# Patient Record
Sex: FEMALE | Race: WHITE | NOT HISPANIC OR LATINO
[De-identification: names, ages, dates, MRNs, and addresses within clinical notes are randomized per-mention and may not be internally consistent; named-entity substitution may affect disease eponyms.]

---

## 2018-04-02 ENCOUNTER — RX ONLY (OUTPATIENT)
Age: 58
Setting detail: RX ONLY
End: 2018-04-02

## 2019-10-15 ENCOUNTER — THERAPY VISIT (OUTPATIENT)
Dept: OCCUPATIONAL THERAPY | Facility: CLINIC | Age: 59
End: 2019-10-15
Payer: COMMERCIAL

## 2019-10-15 DIAGNOSIS — M79.632 PAIN OF LEFT FOREARM: ICD-10-CM

## 2019-10-15 PROCEDURE — 97110 THERAPEUTIC EXERCISES: CPT | Mod: GO | Performed by: OCCUPATIONAL THERAPIST

## 2019-10-15 PROCEDURE — 97165 OT EVAL LOW COMPLEX 30 MIN: CPT | Mod: GO | Performed by: OCCUPATIONAL THERAPIST

## 2019-10-15 PROCEDURE — 97140 MANUAL THERAPY 1/> REGIONS: CPT | Mod: GO | Performed by: OCCUPATIONAL THERAPIST

## 2019-10-15 PROCEDURE — 97035 APP MDLTY 1+ULTRASOUND EA 15: CPT | Mod: GO | Performed by: OCCUPATIONAL THERAPIST

## 2019-10-15 NOTE — LETTER
[facility]  [address]  [place] [address]  [facility] [address]  [phone]    2019    Re: Theresa Palmer   :   1960  MRN:  [id number]   REFERRING PHYSICIAN:   Juanjose Chavez    [facility]  Date of Initial Evaluation:  10/15/19  Visits:  Rxs Used: 1  Reason for Referral:  Pain of left forearm    EVALUATION SUMMARY    Hand Therapy Initial Evaluation    Current Date:  10/15/2019  Referring Physician: Self Referred    Diagnosis: Left forearm pain  DOI: 2019    Subjective:  Theresa Palmer is a 59 year old left hand dominant female.    Patient reports symptoms of pain, stiffness/loss of motion, weakness/loss of strength, numbness and tingling  of the left forearm, wrist and fingers which occurred due to an unknown etiology. Since onset symptoms are Gradually getting better.  Special tests:  none.  Previous treatment: brace.    General health as reported by patient is good.  Pertinent medical history includes:Cancer, Depression, Osteoarthritis, Rheumatoid Arthritis  Medical allergies:sulfa, penicillin.  Surgical history: orthopedic: 8 foots, 1 left shoulder.  Medication history: Anti-depressants, Sleep.    Occupational Profile Information:  Current occupation is [employer]  Currently working in normal job without restrictions  Job Tasks: Computer Work, Prolonged Sitting  Prior functional level:  no limitations  Barriers include:none  Mobility: No difficulty  Transportation: drives  Leisure activities/hobbies: garden, traveling, reading    Upper Extremity Functional Index Score:  SCORE:   Column Totals: /80: 66   (A lower score indicates greater disability.)    Objective:  Pain Level (Scale 0-10):   10/15/19   At Rest 0/10   With Use 3/10     Pain Description:  Date 10/15/2019   Location (L) volar forearm   Pain Quality Aching and Sharp   Frequency intermittent     Pain is worst  daytime   Exacerbated by  use, gripping, lifting into flexion   Relieved  by cold and rest   Progression gradually improving     ROM  Pain Report: - none  + mild    ++ moderate    +++ severe   Wrist 10/15/2019 10/15/2019   AROM (PROM) Right Left   Extension 65 75   Flexion 75 75   RD 22 22   UD 50 45   Supination 80 75   Pronation 80 85      Strength   (Measured in pounds)  Pain Report: - none  + mild    ++ moderate    +++ severe    10/15/19 10/15/19   Trials right Left   1  2  3 39  31  38 41  42  36   Average 36 40     Lat Pinch 10/15/2019 10/15/2019   Trials Right Left   1  2  3 14 14   Average       3 Pt Pinch 10/15/2019 10/15/2019   Trials Right Left   1  2  3 14 13   Average        Edema:  []         None   [x]         Mild    []         Moderate      []         Severe                  Location: (L) proximal volar forearm    Color/Temperature:     [x]        Normal  []        Abnormal    Palpation:  []         Normal        [x]       Tender       []      Mild         [x]       Moderate []       Severe     Location: (L) proximal volar central forearm    Sensation: []                   WNL throughout all nerve distributions; per patient report    [x]                   Decreased  []        Median    []        Ulnar    []        Radial nerve distribution    Sensation: Decreased Median Nerve distribution  Numbness/Tingling Level Report  VAS(0-10) 10/15/19   At Rest: 1/10   With Use: 2-3/10     Special Tests:  Date 10/15/2019    Side Left    Phalens neg    Tinels at CT neg    Tinels at Pronator neg    Tinels at 8 in DRC positive    Median Nerve ULTT NT    Paresthesias IF, MF & RF    Compression test at prox. Volar forearm positive    Compression Test at CT neg      Assessment:  Patient presents with symptoms consistent with diagnosis of arm pain,  with conservative intervention.     Patient's limitations or Problem List includes:  Pain, Increased edema, Weakness, Sensory disturbance, Decreased  and Tightness in musculature of the left wrist, hand and forearm which interferes with  the patient's ability to perform Work Tasks, Recreational Activities and Household Chores as compared to previous level of function.    Rehab Potential:  Excellent - Return to full activity, no limitations    Patient will benefit from skilled Occupational Therapy to increase flexibility, overall strength,  strength, forearm strength and sensation and decrease pain and edema to return to previous activity level and resume normal daily tasks and to reach their rehab potential.    Barriers to Learning:  No barrier    Communication Issues:  Patient appears to be able to clearly communicate and understand verbal and written communication and follow directions correctly.    Chart Review: Brief history including review of medical and/or therapy records relating to the presenting problem and Simple history review with patient    Identified Performance Deficits: driving and community mobility, home establishment and management and work    Assessment of Occupational Performance:  3-5 Performance Deficits    Clinical Decision Making (Complexity): Low complexity    Treatment Explanation:  The following has been discussed with the patient:  RX ordered/plan of care  Anticipated outcomes  Possible risks and side effects    Frequency:  1 X week, once daily  Duration:  for 8 weeks  Treatment Plan:   Modalities:  US  Therapeutic Exercise:  AROM, AAROM, PROM, Tendon Gliding, Blocking, Isotonics and Isometrics  Neuromuscular re-education:  Nerve Gliding and Kinesiotaping  Manual Techniques:  Myofascial release  Orthotic Fabrication:  Static orthosis and Forearm based orthosis (patient has an OTC brace)  Self Care: Diagnostic education    Avoid activities that exacerbate median nerve symptoms    Avoid prolonged flexion or extension of the wrist    Discharge Plan:  Achieve all LTG.  Independent in home treatment program.  Reach maximal therapeutic benefit.    Home Exercise Program:  Warmth and Ice as indicated  Tendon  Glides  Proximal Median Nerve Glides  Massage to flexors  Stretch to flexors    Next Visit:  US  MFR  Stretch to flexors  Passive proximal median nerve glides  Trial of K-tape    Thank you for your referral.    INQUIRIES  Therapist: OC Darby/L, CHT  [facility]  [address]  [place] [address]  [facility] [address]  Phone: [phone]  Fax: [phone]

## 2019-10-15 NOTE — PROGRESS NOTES
Hand Therapy Initial Evaluation    Current Date:  10/15/2019  Referring Physician: Self Referred    Diagnosis: Left forearm pain  DOI: 5/2019    Subjective:  Theresa Palmer is a 59 year old left hand dominant female.    Patient reports symptoms of pain, stiffness/loss of motion, weakness/loss of strength, numbness and tingling  of the left forearm, wrist and fingers which occurred due to an unknown etiology. Since onset symptoms are Gradually getting better.  Special tests:  none.  Previous treatment: brace.    General health as reported by patient is good.  Pertinent medical history includes:Cancer, Depression, Osteoarthritis, Rheumatoid Arthritis  Medical allergies:sulfa, penicillin.  Surgical history: orthopedic: 8 foots, 1 left shoulder.  Medication history: Anti-depressants, Sleep.    Occupational Profile Information:  Current occupation is [employer]  Currently working in normal job without restrictions  Job Tasks: Computer Work, Prolonged Sitting  Prior functional level:  no limitations  Barriers include:none  Mobility: No difficulty  Transportation: drives  Leisure activities/hobbies: garden, traveling, reading    Upper Extremity Functional Index Score:  SCORE:   Column Totals: /80: 66   (A lower score indicates greater disability.)    Objective:  Pain Level (Scale 0-10):   10/15/19   At Rest 0/10   With Use 3/10     Pain Description:  Date 10/15/2019   Location (L) volar forearm   Pain Quality Aching and Sharp   Frequency intermittent     Pain is worst  daytime   Exacerbated by  use, gripping, lifting into flexion   Relieved by cold and rest   Progression gradually improving     ROM  Pain Report: - none  + mild    ++ moderate    +++ severe   Wrist 10/15/2019 10/15/2019   AROM (PROM) Right Left   Extension 65 75   Flexion 75 75   RD 22 22   UD 50 45   Supination 80 75   Pronation 80 85      Strength   (Measured in pounds)  Pain Report: - none  + mild    ++ moderate    +++ severe     10/15/19 10/15/19   Trials right Left   1  2  3 39  31  38 41  42  36   Average 36 40     Lat Pinch 10/15/2019 10/15/2019   Trials Right Left   1  2  3 14 14   Average       3 Pt Pinch 10/15/2019 10/15/2019   Trials Right Left   1  2  3 14 13   Average        Edema:  []         None   [x]         Mild    []         Moderate      []         Severe                  Location: (L) proximal volar forearm    Color/Temperature:     [x]        Normal  []        Abnormal    Palpation:  []         Normal        [x]       Tender       []      Mild         [x]       Moderate []       Severe     Location: (L) proximal volar central forearm    Sensation: []                   WNL throughout all nerve distributions; per patient report    [x]                   Decreased  []        Median    []        Ulnar    []        Radial nerve distribution    Sensation: Decreased Median Nerve distribution  Numbness/Tingling Level Report  VAS(0-10) 10/15/19   At Rest: 1/10   With Use: 2-3/10     Special Tests:  Date 10/15/2019    Side Left    Phalens neg    Tinels at CT neg    Tinels at Pronator neg    Tinels at 8 in DRC positive    Median Nerve ULTT NT    Paresthesias IF, MF & RF    Compression test at prox. Volar forearm positive    Compression Test at CT neg      Assessment:  Patient presents with symptoms consistent with diagnosis of arm pain,  with conservative intervention.     Patient's limitations or Problem List includes:  Pain, Increased edema, Weakness, Sensory disturbance, Decreased  and Tightness in musculature of the left wrist, hand and forearm which interferes with the patient's ability to perform Work Tasks, Recreational Activities and Household Chores as compared to previous level of function.    Rehab Potential:  Excellent - Return to full activity, no limitations    Patient will benefit from skilled Occupational Therapy to increase flexibility, overall strength,  strength, forearm strength and sensation and decrease  pain and edema to return to previous activity level and resume normal daily tasks and to reach their rehab potential.    Barriers to Learning:  No barrier    Communication Issues:  Patient appears to be able to clearly communicate and understand verbal and written communication and follow directions correctly.    Chart Review: Brief history including review of medical and/or therapy records relating to the presenting problem and Simple history review with patient    Identified Performance Deficits: driving and community mobility, home establishment and management and work    Assessment of Occupational Performance:  3-5 Performance Deficits    Clinical Decision Making (Complexity): Low complexity    Treatment Explanation:  The following has been discussed with the patient:  RX ordered/plan of care  Anticipated outcomes  Possible risks and side effects    Frequency:  1 X week, once daily  Duration:  for 8 weeks  Treatment Plan:   Modalities:  US  Therapeutic Exercise:  AROM, AAROM, PROM, Tendon Gliding, Blocking, Isotonics and Isometrics  Neuromuscular re-education:  Nerve Gliding and Kinesiotaping  Manual Techniques:  Myofascial release  Orthotic Fabrication:  Static orthosis and Forearm based orthosis (patient has an OTC brace)  Self Care: Diagnostic education    Avoid activities that exacerbate median nerve symptoms    Avoid prolonged flexion or extension of the wrist    Discharge Plan:  Achieve all LTG.  Independent in home treatment program.  Reach maximal therapeutic benefit.      Home Exercise Program:  Warmth and Ice as indicated  Tendon Glides  Proximal Median Nerve Glides  Massage to flexors  Stretch to flexors    Next Visit:  US  MFR  Stretch to flexors  Passive proximal median nerve glides  Trial of K-tape

## 2019-12-11 PROBLEM — M79.632 PAIN OF LEFT FOREARM: Status: RESOLVED | Noted: 2019-10-15 | Resolved: 2019-12-11

## 2020-12-30 ENCOUNTER — THERAPY VISIT (OUTPATIENT)
Dept: OCCUPATIONAL THERAPY | Facility: CLINIC | Age: 60
End: 2020-12-30
Payer: COMMERCIAL

## 2020-12-30 DIAGNOSIS — M05.741 RHEUMATOID ARTHRITIS INVOLVING BOTH HANDS WITH POSITIVE RHEUMATOID FACTOR (H): ICD-10-CM

## 2020-12-30 DIAGNOSIS — M79.642 PAIN IN BOTH HANDS: ICD-10-CM

## 2020-12-30 DIAGNOSIS — M79.641 PAIN IN BOTH HANDS: ICD-10-CM

## 2020-12-30 DIAGNOSIS — M19.90 ARTHRITIS: ICD-10-CM

## 2020-12-30 DIAGNOSIS — M05.742 RHEUMATOID ARTHRITIS INVOLVING BOTH HANDS WITH POSITIVE RHEUMATOID FACTOR (H): ICD-10-CM

## 2020-12-30 PROCEDURE — 97530 THERAPEUTIC ACTIVITIES: CPT | Mod: GO | Performed by: OCCUPATIONAL THERAPIST

## 2020-12-30 PROCEDURE — 97165 OT EVAL LOW COMPLEX 30 MIN: CPT | Mod: GO | Performed by: OCCUPATIONAL THERAPIST

## 2020-12-30 PROCEDURE — 97018 PARAFFIN BATH THERAPY: CPT | Mod: GO | Performed by: OCCUPATIONAL THERAPIST

## 2020-12-30 PROCEDURE — 97110 THERAPEUTIC EXERCISES: CPT | Mod: GO | Performed by: OCCUPATIONAL THERAPIST

## 2020-12-30 NOTE — PROGRESS NOTES
Hand Therapy Initial Evaluation    Current Date:  12/30/2020  Referring Physician:Zulay Laguna DO      Diagnosis: Bilateral hand pain, RA (left greater than right)  DOI: 12/14/20 (Date of order)    Subjective:  Theresa Palmer is a 60 year old left hand dominant female.    Patient reports symptoms of pain, stiffness/loss of motion, weakness/loss of strength, edema, numbness and tingling  of the bilateral hands and wrists which occurred due to overuse and RA. Since onset symptoms are Gradually getting worse.  Special tests:  x-ray.  Previous treatment: hand therapy.    Pertinent medical history includes:Cancer, Depression, Osteoarthritis, Rheumatoid Arthritis, Mental Illness, Menopausal, calf pain, swelling, warmth  Medical allergies:sulfa, penicillin.  Surgical history: orthopedic: 8 foots, 1 left shoulder.  Medication history: Anti-depressants, Sleep, Enbrel, Methotrexate, Cymbalta, Wellbutrin, Klonapin,      Occupational Profile Information:  Current occupation is None, Currently on short term disability, applying for long term  Currently not working due to present treatment problem  Job Tasks: Computer Work, Driving, Lifting, Carrying, Prolonged Sitting, Repetitive Tasks  Prior functional level:  no limitations  Barriers include:none  Mobility: No difficulty  Transportation: drives  Leisure activities/hobbies: reading, walk, needlework, travel, biking, gardening    Upper Extremity Functional Index Score:  SCORE:   Column Totals: /80: 47   (A lower score indicates greater disability.)      Objective:  Pain Level (Scale 0-10):   12/30/20   At Rest 1-2/10   With Use 8-10/10     Pain Description:  Date 12/30/2020   Location wrist, hand and tips of fingers   Pain Quality Aching, Numb, Sharp, Tingling, tender and intense   Frequency intermittent     Pain is worst  daytime   Exacerbated by  typing, writing, gripping, pinching, lifting,    Relieved by rest and Tylenol, ice on wrists   Progression Gradually worsening      ROM  Pain Report:  + mild    ++ moderate    +++ severe   Wrist 12/30/2020 12/30/2020   AROM (PROM) Right Left   Extension 55 62   Flexion 75 65   RD 20 25   UD 50 45   Supination 75 75   Pronation 85 80       ROM  Thumb 12/30/2020 12/30/2020   AROM  (PROM) Right Left   MP / /   IP / /   RABD 50 45   PABD 50 50   Kapandji Opposition Scale (0-10/10) 10/10 10/10     Thumb Observation/Appearance  Key: + = present/ - = not observed    12/30/2020   Shoulder deformity present over CMC R:-  L:-   Volar subluxation present R:-  L:-   Edema over the CMC joint R:-  L:-   Noted collapse of MP into hyperextension during pinch R:-  L:-   Tenderness at CMC R:-  L:+      Provocative Tests  Pain Report:  - none    + mild    ++ moderate    +++ severe     12/30/2020   CMC Grind test R: -  L: -   Crepitus present R: -  L: -   CMC Adduction Stress Test R: +  L: +   CMC Extension Stress Test R: -  L: -   Finkelstein's R: -  L: -     Strength   (Measured in pounds)  Pain Report: - none  + mild    ++ moderate    +++ severe    12/30/2020 12/30/2020   Trials Right Left   1  2  3 34  36  37 31  26  32   Average 36 30     Lat Pinch 12/30/2020 12/30/2020   Trials Right Left   1  2  3 10 13   Average       3 Pt Pinch 12/30/2020 12/30/2020   Trials Right Left   1  2  3 8 8   Average       Assessment:  Patient presents with symptoms consistent with diagnosis of hand arthritis, with conservative intervention.    Patient s limitations or Problem List includes:  Pain, Decreased ROM/motion, weakness,  decreased  and pinch strength which interferes with patients ability to perform  self care, home maintenance, work and sports/recreational as compared to previous level of function.    Rehab Potential:  Good - Return to full activity, some limitations    Patient will benefit from skilled Occupational Therapy to increase ROM, flexibility and overall strength and decrease pain to return to previous activity level and resume normal daily tasks  and to reach their rehab potential.    Barriers to Learning:  No barrier    Communication Issues:  Patient appears to be able to clearly communicate and understand verbal and written communication and follow directions correctly.    Chart Review: Brief history including review of medical and/or therapy records relating to the presenting problem and Simple history review with patient    Identified Performance Deficits: bathing/showering, home establishment and management, meal preparation and cleanup, sleep, work and leisure activities    Assessment of Occupational Performance:  5 or more Performance Deficits    Clinical Decision Making (Complexity): Low complexity    Treatment Explanation:  The following has been discussed with the patient:  RX ordered/plan of care  Anticipated outcomes  Possible risks and side effects    Plan:  Frequency:  1 X a month, once daily  Duration:  for 4 months    Treatment Plan:  Modalities:  Paraffin  Therapeutic Exercise: AROM, Isometrics, Tendon glides  Manual Techniques: Joint Mobilization or reseating of the trapezium, self MFR to thumb adductor with clip  Education: Anatomy of CMC, joint protection principles, adaptive equipment as needed    Discharge Plan:  Achieve all LTG  [place] in home treatment program.  Reach maximal therapeutic benefit.    Home Program:  Warmth  Incorporate joint protection into daily functional activities  Adaptive equipment as needed.  Exercise Name: Wrist Active Range of Motion Extension and Flexion Combined - Reps: 10-30 reps  Exercise Name: Active Range of Motion Combined Radial and Ulnar Deviation - Reps: 10-30 reps  Exercise Name: Forearm Active Range of Motion Combined Pronation and Supination - Reps: 10-30 reps  Exercise Name: Wrist Active Range of Motion Circumduction - Reps: 10-30 reps  Exercise Name: Finger Active Range of Motion Tendon Glides Fist Series, Sets: 4 times/day - Reps: 10  Exercise Name: Finger Active Range of Motion Table Top  Fist Series, Sets: 4 times/day - Reps: 10  Exercise Name: Finger Active Range of Motion PIP Joint Blocking, Sets: 2 times/day - Reps: 10  Exercise Name: Finger Active Range of Motion DIP Joint Blocking, Sets: 2 times/day - Reps: 10  Exercise Name:  Intrinsic Finger Active Range of Motion Ab and Adduction, Sets: 3 times/day - Reps: 10  Exercise Name: Thumb Active Range of Motion Opposition, Sets: 3 times/day - Reps: 10    Next Visit:  No further visits scheduled at this time. The chart will be left open for one month to allow patient to schedule further appointments if problems develop. If no additional therapy sessions are scheduled, then the patient will be discharged and this will serve as a discharge note.

## 2020-12-30 NOTE — LETTER
[facility]  [address]  [place] [address]  [facility] [address] [phone]    2020    Re: Theresa Palmer   :   1960  MRN:  [id number]   REFERRING PHYSICIAN:   Zulay MARTINEZ [facility]    Date of Initial Evaluation:  2020  Visits: 1 Rxs Used: 1  Reason for Referral:     Pain in both hands  Rheumatoid arthritis involving both hands with positive rheumatoid factor (H)  Arthritis    Hand Therapy Initial Evaluation    Current Date:  2020  Referring Physician:Zulay Laguna DO      Diagnosis: Bilateral hand pain, RA (left greater than right)  DOI: 20 (Date of order)    Subjective:  Theresa Palmer is a 60 year old left hand dominant female.    Patient reports symptoms of pain, stiffness/loss of motion, weakness/loss of strength, edema, numbness and tingling  of the bilateral hands and wrists which occurred due to overuse and RA. Since onset symptoms are Gradually getting worse.  Special tests:  x-ray.  Previous treatment: hand therapy.    Pertinent medical history includes:Cancer, Depression, Osteoarthritis, Rheumatoid Arthritis, Mental Illness, Menopausal, calf pain, swelling, warmth  Medical allergies:sulfa, penicillin.  Surgical history: orthopedic: 8 foots, 1 left shoulder.  Medication history: Anti-depressants, Sleep, Enbrel, Methotrexate, Cymbalta, Wellbutrin, Klonapin,      Occupational Profile Information:  Current occupation is None, Currently on short term disability, applying for long term  Currently not working due to present treatment problem  Job Tasks: Computer Work, Driving, Lifting, Carrying, Prolonged Sitting, Repetitive Tasks  Prior functional level:  no limitations  Barriers include:none  Mobility: No difficulty  Transportation: drives  Leisure activities/hobbies: reading, walk, needlework, travel, biking, gardening    Upper Extremity Functional Index Score:  Re: Theresa Palmer   :    1960  SCORE:   Column Totals: /80: 47   (A lower score indicates greater disability.)      Objective:  Pain Level (Scale 0-10):   20   At Rest 1-2/10   With Use 8-10/10     Pain Description:  Date 2020   Location wrist, hand and tips of fingers   Pain Quality Aching, Numb, Sharp, Tingling, tender and intense   Frequency intermittent     Pain is worst  daytime   Exacerbated by  typing, writing, gripping, pinching, lifting,    Relieved by rest and Tylenol, ice on wrists   Progression Gradually worsening     ROM  Pain Report:  + mild    ++ moderate    +++ severe   Wrist 2020   AROM (PROM) Right Left   Extension 55 62   Flexion 75 65   RD 20 25   UD 50 45   Supination 75 75   Pronation 85 80       ROM  Thumb 2020   AROM  (PROM) Right Left   MP / /   IP / /   RABD 50 45   PABD 50 50   Kapandji Opposition Scale (0-10/10) 10/10 10/10     Re: Theresa Palmer   :   1960  Thumb Observation/Appearance  Key: + = present/ - = not observed    2020   Shoulder deformity present over CMC R:-  L:-   Volar subluxation present R:-  L:-   Edema over the CMC joint R:-  L:-   Noted collapse of MP into hyperextension during pinch R:-  L:-   Tenderness at CMC R:-  L:+      Provocative Tests  Pain Report:  - none    + mild    ++ moderate    +++ severe     2020   CMC Grind test R: -  L: -   Crepitus present R: -  L: -   CMC Adduction Stress Test R: +  L: +   CMC Extension Stress Test R: -  L: -   Finkelstein's R: -  L: -     Strength   (Measured in pounds)  Pain Report: - none  + mild    ++ moderate    +++ severe    2020   Trials Right Left   1  2  3 34  36  37 31  26  32   Average 36 30     Lat Pinch 2020   Trials Right Left   1  2  3 10 13   Average       3 Pt Pinch 2020   Trials Right Left   1  2  3 8 8   Average     Re: Theresa Palmer   :   1960  Assessment:  Patient presents with symptoms consistent with  diagnosis of hand arthritis, with conservative intervention.    Patient s limitations or Problem List includes:  Pain, Decreased ROM/motion, weakness,  decreased  and pinch strength which interferes with patients ability to perform  self care, home maintenance, work and sports/recreational as compared to previous level of function.    Rehab Potential:  Good - Return to full activity, some limitations    Patient will benefit from skilled Occupational Therapy to increase ROM, flexibility and overall strength and decrease pain to return to previous activity level and resume normal daily tasks and to reach their rehab potential.    Barriers to Learning:  No barrier    Communication Issues:  Patient appears to be able to clearly communicate and understand verbal and written communication and follow directions correctly.    Chart Review: Brief history including review of medical and/or therapy records relating to the presenting problem and Simple history review with patient    Identified Performance Deficits: bathing/showering, home establishment and management, meal preparation and cleanup, sleep, work and leisure activities    Assessment of Occupational Performance:  5 or more Performance Deficits    Clinical Decision Making (Complexity): Low complexity    Treatment Explanation:  The following has been discussed with the patient:  RX ordered/plan of care  Anticipated outcomes  Possible risks and side effects    Plan:  Frequency:  1 X a month, once daily  Duration:  for 4 months    Treatment Plan:  Modalities:  Paraffin  Therapeutic Exercise: AROM, Isometrics, Tendon glides  Manual Techniques: Joint Mobilization or reseating of the trapezium, self MFR to thumb adductor with clip  Education: Anatomy of CMC, joint protection principles, adaptive equipment as needed    Discharge Plan:  Achieve all LTG  [place] in home treatment program.  Reach maximal therapeutic benefit.    Home Program:  [facility]  Re: Theresa Palmer    :   1960  Incorporate joint protection into daily functional activities  Adaptive equipment as needed.  Exercise Name: Wrist Active Range of Motion Extension and Flexion Combined - Reps: 10-30 reps  Exercise Name: Active Range of Motion Combined Radial and Ulnar Deviation - Reps: 10-30 reps  Exercise Name: Forearm Active Range of Motion Combined Pronation and Supination - Reps: 10-30 reps  Exercise Name: Wrist Active Range of Motion Circumduction - Reps: 10-30 reps  Exercise Name: Finger Active Range of Motion Tendon Glides Fist Series, Sets: 4 times/day - Reps: 10  Exercise Name: Finger Active Range of Motion Table Top Fist Series, Sets: 4 times/day - Reps: 10  Exercise Name: Finger Active Range of Motion PIP Joint Blocking, Sets: 2 times/day - Reps: 10  Exercise Name: Finger Active Range of Motion DIP Joint Blocking, Sets: 2 times/day - Reps: 10  Exercise Name:  Intrinsic Finger Active Range of Motion Ab and Adduction, Sets: 3 times/day - Reps: 10  Exercise Name: Thumb Active Range of Motion Opposition, Sets: 3 times/day - Reps: 10    Next Visit:  No further visits scheduled at this time. The chart will be left open for one month to allow patient to schedule further appointments if problems develop. If no additional therapy sessions are scheduled, then the patient will be discharged and this will serve as a discharge note.    Thank you for your referral.    INQUIRIES  Therapist: Zuleima Rodrigez [facility]  [address]  [address]  [facility] [address]  Phone: [phone]  Fax: [phone]

## 2021-02-22 PROBLEM — M79.642 PAIN IN BOTH HANDS: Status: RESOLVED | Noted: 2020-12-30 | Resolved: 2021-02-22

## 2021-02-22 PROBLEM — M05.742 RHEUMATOID ARTHRITIS INVOLVING BOTH HANDS WITH POSITIVE RHEUMATOID FACTOR (H): Status: RESOLVED | Noted: 2020-12-30 | Resolved: 2021-02-22

## 2021-02-22 PROBLEM — M79.641 PAIN IN BOTH HANDS: Status: RESOLVED | Noted: 2020-12-30 | Resolved: 2021-02-22

## 2021-02-22 PROBLEM — M05.741 RHEUMATOID ARTHRITIS INVOLVING BOTH HANDS WITH POSITIVE RHEUMATOID FACTOR (H): Status: RESOLVED | Noted: 2020-12-30 | Resolved: 2021-02-22

## 2021-02-22 PROBLEM — M19.90 ARTHRITIS: Status: RESOLVED | Noted: 2020-12-30 | Resolved: 2021-02-22

## 2021-07-07 ENCOUNTER — RX ONLY (OUTPATIENT)
Age: 61
Setting detail: RX ONLY
End: 2021-07-07

## 2022-06-26 ENCOUNTER — HEALTH MAINTENANCE LETTER (OUTPATIENT)
Age: 62
End: 2022-06-26

## 2022-09-20 ENCOUNTER — OFFICE VISIT (OUTPATIENT)
Dept: PODIATRY | Facility: CLINIC | Age: 62
End: 2022-09-20
Payer: COMMERCIAL

## 2022-09-20 VITALS
BODY MASS INDEX: 26.05 KG/M2 | SYSTOLIC BLOOD PRESSURE: 100 MMHG | DIASTOLIC BLOOD PRESSURE: 62 MMHG | HEIGHT: 63 IN | WEIGHT: 147 LBS

## 2022-09-20 DIAGNOSIS — L60.3 ONYCHODYSTROPHY: ICD-10-CM

## 2022-09-20 DIAGNOSIS — S93.325A CLOSED DISLOCATION OF TARSOMETATARSAL JOINT OF LEFT FOOT, INITIAL ENCOUNTER: Primary | ICD-10-CM

## 2022-09-20 DIAGNOSIS — L85.9 HYPERKERATOSIS: ICD-10-CM

## 2022-09-20 DIAGNOSIS — M06.9 RHEUMATOID ARTHRITIS OF BOTH ANKLES, UNSPECIFIED WHETHER RHEUMATOID FACTOR PRESENT (H): ICD-10-CM

## 2022-09-20 PROCEDURE — 99203 OFFICE O/P NEW LOW 30 MIN: CPT | Performed by: PODIATRIST

## 2022-09-20 RX ORDER — ADALIMUMAB 40MG/0.4ML
KIT SUBCUTANEOUS
COMMUNITY
Start: 2022-06-22

## 2022-09-20 RX ORDER — ATORVASTATIN CALCIUM 80 MG/1
80 TABLET, FILM COATED ORAL
COMMUNITY
Start: 2021-12-10

## 2022-09-20 RX ORDER — DULOXETIN HYDROCHLORIDE 60 MG/1
60 CAPSULE, DELAYED RELEASE ORAL
COMMUNITY
Start: 2019-09-15

## 2022-09-20 RX ORDER — CLONAZEPAM 0.5 MG/1
TABLET ORAL
COMMUNITY
Start: 2022-07-06

## 2022-09-20 RX ORDER — BUPROPION HYDROCHLORIDE 300 MG/1
300 TABLET ORAL EVERY MORNING
COMMUNITY
Start: 2020-03-01

## 2022-09-20 ASSESSMENT — PAIN SCALES - GENERAL: PAINLEVEL: NO PAIN (0)

## 2022-09-20 NOTE — LETTER
9/20/2022         RE: Theresa Palmer  [address] [facility] [address]        Dear Colleague,    Thank you for referring your patient, Theresa Palmer, to the [facility]. Please see a copy of my visit note below.    HPI:  Theresa Palmer is a 62 year old female who is seen in consultation at the request of self.    Pt presents for eval of:   (Onset, Location, L/R, Character, Treatments, Injury if yes)    Stroke - 6/8/2021, 1/18/2021     1. Toenail fungus. 10/2021, Lamisil 250 mg for 3 months. No change.  2. Left and right medial Left great toe callus. Plantar Right 1st metatarsal head.    Retired.      ROS:  10 point ROS neg other than the symptoms noted above in the HPI.    Patient Active Problem List   Diagnosis   (none) - all problems resolved or deleted       PAST MEDICAL HISTORY: History reviewed. No pertinent past medical history.     PAST SURGICAL HISTORY: History reviewed. No pertinent surgical history.     MEDICATIONS:   Current Outpatient Medications:      adalimumab (HUMIRA *CF*) 40 MG/0.4ML prefilled syringe kit, , Disp: , Rfl:      aspirin (ASA) 81 MG EC tablet, Take 81 mg by mouth, Disp: , Rfl:      atorvastatin (LIPITOR) 80 MG tablet, Take 80 mg by mouth, Disp: , Rfl:      buPROPion (WELLBUTRIN XL) 300 MG 24 hr tablet, Take 300 mg by mouth every morning, Disp: , Rfl:      calcium carbonate 600 mg-vitamin D 400 units (CALCIUM CARB-CHOLECALCIFEROL) 600-400 MG-UNIT per tablet, Take 1 tablet by mouth daily, Disp: , Rfl:      clonazePAM (KLONOPIN) 0.5 MG tablet, TAKE ONE TABLET BY MOUTH FOUR TIMES A DAY AS NEEDED FOR ANXIETY, Disp: , Rfl:      DULoxetine (CYMBALTA) 60 MG capsule, Take 60 mg by mouth, Disp: , Rfl:      ALLERGIES:    Allergies   Allergen Reactions     Penicillins Diarrhea, GI Disturbance and Other (See Comments)     Other reaction(s): GI Upset     Contrast Dye Other (See Comments) and Rash     CT contrast dye-Rash      Sulfa Drugs Hives and Rash       "  SOCIAL HISTORY:   Social History     Socioeconomic History     Marital status:      Spouse name: Not on file     Number of children: Not on file     Years of education: Not on file     Highest education level: Not on file   Occupational History     Not on file   Tobacco Use     Smoking status: Former Smoker     Quit date: 2017     Years since quittin.0     Smokeless tobacco: Never Used   Substance and Sexual Activity     Alcohol use: Not on file     Drug use: Not on file     Sexual activity: Not on file   Other Topics Concern     Not on file   Social History Narrative     Not on file     Social Determinants of Health     Financial Resource Strain: Not on file   Food Insecurity: Not on file   Transportation Needs: Not on file   Physical Activity: Not on file   Stress: Not on file   Social Connections: Not on file   Intimate Partner Violence: Not on file   Housing Stability: Not on file        FAMILY HISTORY: History reviewed. No pertinent family history.     EXAM:Vitals: /62 (BP Location: Left arm, Patient Position: Sitting, Cuff Size: Adult Regular)   Ht 1.59 m (5' 2.6\")   Wt 66.7 kg (147 lb)   BMI 26.38 kg/m    BMI= Body mass index is 26.38 kg/m .    General appearance: Patient is alert and fully cooperative with history & exam.  No sign of distress is noted during the visit.     Psychiatric: Affect is pleasant & appropriate.  Patient appears motivated to improve health.     Respiratory: Breathing is regular & unlabored while sitting.     HEENT: Hearing is intact to spoken word.  Speech is clear.  No gross evidence of visual impairment that would impact ambulation.     Vascular: DP & PT pulses are intact & regular bilaterally.  No significant edema or varicosities noted.  CFT and skin temperature is normal to both lower extremities.     Neurologic: Lower extremity sensation is intact to light touch.  No evidence of weakness or contracture in the lower extremities.  No evidence of " neuropathy.    Dermatologic: Skin is intact to both lower extremities with mildly diminished texture, turgor and tone about the integument.  No paronychia or evidence of soft tissue infection is noted.  Hyperkeratosis noted about the distal aspect of several toes and plantar right first metatarsal head and hallux IPJ bilateral with skin lines intact.    Musculoskeletal: Patient is ambulatory without assistive device or brace.  Rigidity is noted throughout the foot and ankle midfoot metatarsal phalangeal joints and toes.  Multiple cicatrix noted over the left much greater than right foot with limited range of motion.  Minimal inversion eversion.  Subtle subtalar joint and ankle range of motion.  Limited range of motion through multiple toes.  Patient has had a first metatarsal second metatarsal cuneiform joint on the left foot without symptoms.  Today most of her symptoms are associated with hyperkeratosis on the distal aspect of several hammertoes and plantar right first metatarsal head.      Radiographs 3 views bilateral demonstrate plate fixation of the first second metatarsocuneiform joint left foot.  Mild degenerative changes arthrodesis of several of the lesser toes.     ASSESSMENT:       ICD-10-CM    1. Closed dislocation of tarsometatarsal joint of left foot, initial encounter  S93.325A    2. Rheumatoid arthritis of both ankles, unspecified whether rheumatoid factor present (H)  M06.9    3. Hyperkeratosis  L85.9    4. Onychodystrophy  L60.3         PLAN:  Reviewed patient's chart in [facility].      9/20/2022   Obtained and interpreted radiographs  Recommended custom molded orthotics order placed  Recommended appropriate shoe gear written instructions dispensed  Recommended follow-up with rheumatology to manage autoimmune disease.  Follow-up after utilizing the orthotics and changes in shoe gear  Also discussed with written instructions at home debridement recommended cream product such as Cetaphil once daily to  create a barrier and daily abrasive debridement of all hyperkeratosis with an Amope or similar personal troy.  This must be performed every bath or shower to stay ahead of this rather than waiting once a month.  All questions were answered.  Follow-up as needed.      Lorenzo Coughlin DPM          Again, thank you for allowing me to participate in the care of your patient.        Sincerely,        Lorenzo Coughlin DPM

## 2022-09-20 NOTE — PATIENT INSTRUCTIONS
Reliable shoe stores: To maximize your experience and provide the best possible fit.  Be sure to show them your foot concerns and tell them Dr. Coughlin sent you.      Stores listed in bold have only athletic shoes, and stores that are not bold are mostly casual or variety of shoes    [employer]  [address] [place] [address]  [phone]    [employer] [place]  [address] [place] [address]  [phone]    [employer] [place]  [address] [place] [address]  [phone]    [employer]  [address] [facility]  [facility] [address]  [phone]    [employer]  [address] [place], [place] [address] [phone]    [employer]  [address] [place] [address]  [phone]                         [employer] Locations:     [address] [place] [address]   [phone]     [address] [place] [address] [address] [place] [address]   [phone]     [address] [place] [address]   [phone]     [address] [facility] [place] [address]   [phone]     [address] [place] [address]   [phone]     [address] [place] [address]   [phone]     [address] [facility] [address]   [place] [address]   [phone]     [address] [facility]   [place] [address]   [phone]             How to find reasonable shoes          The correct width    Correct Fitting    Correct Length      Foot Distortion    Posture Distortion                          Torsional Rigidity      Grasp behind the heel and underneath the foot and twist      Bad    Excessive torsion/twist in midfoot     Less torsion/twist in midfoot is better                   Heel Counter Rigidity      Grasp just above   midsole and squeeze      Bad    Soft heel counter      Good    Rigid Heel Counter      Flexion Rigidity      Grasp shoe and bend from forefoot to rearfoot              Calluses, Corns, IPKs, Porokeratosis    When  there is excessive friction or pressure on the skin, the body responds by making the skin thicker.  While this may protect the deeper structures, the thickened skin can take up more space and thus increase pressure over a bony prominence or become an open sore or skin ulcer as this skin becomes less flexible.    Flat, diffuse thickening are simple calluses and they are usually caused by friction.  Often these are the result of rubbing on a shoe or going barefoot.    Calluses with a central core between the toes are called corns.  These result from prominent joints on adjacent toes rubbing together.  Theses are a symptom of bone malalignment and will always recur unless the underlying bones are addressed surgically.    Most of these lesions can be kept comfortable with routine maintenance. This consists of filing them with a Ped Egg, callus file, or 120 grit sandpaper on a block, every day during your bath or shower.  Most people prefer battery operated wilburn such as an Amope', Personal Pedi and Emjoi for regular use or heavy painful callouses.  Heavy creams or ointments can be applied 1-2 times every day to keep them soft. Toe spacers can be used for corns, gel pads can be used for other lesions on the bottom of the foot. If there is a deformity noted, such as a prominent bone, often this can be addressed to minimize recurrence. However, sometimes the pressure and lesion simply migrates to another spot after surgery, so it is not a guaranteed cure.     www.pedifix.com is the best source for all sorts of foot pads and cushions    If you have severe callouses and cracking, you may apply heavy greasy cream or ointments that you scoop up such as Cetaphil cream, Eucerin, Aquaphor or Vaseline.  Be sure to obtain cream or ointment in these brands and not lotion (lotion is water based and not durable enough for feet). For more aggressive help apply heavy creams or ointment under occlusive dressings such as Saran Wrap or  Jelly Feet while sleeping.   Jelly Feet can be obtained at www.jellyfeet.com.     To be successful with managing hyperkeratotic skin, you must manage hygiene daily.  At your bath or shower time is the easiest time to work on this.  There is no medical or surgical treatment that will absolutely eliminate many of these and symptoms.    Please call with any additional questions.

## 2022-09-20 NOTE — PROGRESS NOTES
HPI:  Theresa Palmer is a 62 year old female who is seen in consultation at the request of self.    Pt presents for eval of:   (Onset, Location, L/R, Character, Treatments, Injury if yes)    Stroke - 6/8/2021, 1/18/2021     1. Toenail fungus. 10/2021, Lamisil 250 mg for 3 months. No change.  2. Left and right medial Left great toe callus. Plantar Right 1st metatarsal head.    Retired.      ROS:  10 point ROS neg other than the symptoms noted above in the HPI.    Patient Active Problem List   Diagnosis   (none) - all problems resolved or deleted       PAST MEDICAL HISTORY: History reviewed. No pertinent past medical history.     PAST SURGICAL HISTORY: History reviewed. No pertinent surgical history.     MEDICATIONS:   Current Outpatient Medications:      adalimumab (HUMIRA *CF*) 40 MG/0.4ML prefilled syringe kit, , Disp: , Rfl:      aspirin (ASA) 81 MG EC tablet, Take 81 mg by mouth, Disp: , Rfl:      atorvastatin (LIPITOR) 80 MG tablet, Take 80 mg by mouth, Disp: , Rfl:      buPROPion (WELLBUTRIN XL) 300 MG 24 hr tablet, Take 300 mg by mouth every morning, Disp: , Rfl:      calcium carbonate 600 mg-vitamin D 400 units (CALCIUM CARB-CHOLECALCIFEROL) 600-400 MG-UNIT per tablet, Take 1 tablet by mouth daily, Disp: , Rfl:      clonazePAM (KLONOPIN) 0.5 MG tablet, TAKE ONE TABLET BY MOUTH FOUR TIMES A DAY AS NEEDED FOR ANXIETY, Disp: , Rfl:      DULoxetine (CYMBALTA) 60 MG capsule, Take 60 mg by mouth, Disp: , Rfl:      ALLERGIES:    Allergies   Allergen Reactions     Penicillins Diarrhea, GI Disturbance and Other (See Comments)     Other reaction(s): GI Upset     Contrast Dye Other (See Comments) and Rash     CT contrast dye-Rash      Sulfa Drugs Hives and Rash        SOCIAL HISTORY:   Social History     Socioeconomic History     Marital status:      Spouse name: Not on file     Number of children: Not on file     Years of education: Not on file     Highest education level: Not on file   Occupational History      "Not on file   Tobacco Use     Smoking status: Former Smoker     Quit date: 2017     Years since quittin.0     Smokeless tobacco: Never Used   Substance and Sexual Activity     Alcohol use: Not on file     Drug use: Not on file     Sexual activity: Not on file   Other Topics Concern     Not on file   Social History Narrative     Not on file     Social Determinants of Health     Financial Resource Strain: Not on file   Food Insecurity: Not on file   Transportation Needs: Not on file   Physical Activity: Not on file   Stress: Not on file   Social Connections: Not on file   Intimate Partner Violence: Not on file   Housing Stability: Not on file        FAMILY HISTORY: History reviewed. No pertinent family history.     EXAM:Vitals: /62 (BP Location: Left arm, Patient Position: Sitting, Cuff Size: Adult Regular)   Ht 1.59 m (5' 2.6\")   Wt 66.7 kg (147 lb)   BMI 26.38 kg/m    BMI= Body mass index is 26.38 kg/m .    General appearance: Patient is alert and fully cooperative with history & exam.  No sign of distress is noted during the visit.     Psychiatric: Affect is pleasant & appropriate.  Patient appears motivated to improve health.     Respiratory: Breathing is regular & unlabored while sitting.     HEENT: Hearing is intact to spoken word.  Speech is clear.  No gross evidence of visual impairment that would impact ambulation.     Vascular: DP & PT pulses are intact & regular bilaterally.  No significant edema or varicosities noted.  CFT and skin temperature is normal to both lower extremities.     Neurologic: Lower extremity sensation is intact to light touch.  No evidence of weakness or contracture in the lower extremities.  No evidence of neuropathy.    Dermatologic: Skin is intact to both lower extremities with mildly diminished texture, turgor and tone about the integument.  No paronychia or evidence of soft tissue infection is noted.  Hyperkeratosis noted about the distal aspect of several toes and " plantar right first metatarsal head and hallux IPJ bilateral with skin lines intact.    Musculoskeletal: Patient is ambulatory without assistive device or brace.  Rigidity is noted throughout the foot and ankle midfoot metatarsal phalangeal joints and toes.  Multiple cicatrix noted over the left much greater than right foot with limited range of motion.  Minimal inversion eversion.  Subtle subtalar joint and ankle range of motion.  Limited range of motion through multiple toes.  Patient has had a first metatarsal second metatarsal cuneiform joint on the left foot without symptoms.  Today most of her symptoms are associated with hyperkeratosis on the distal aspect of several hammertoes and plantar right first metatarsal head.      Radiographs 3 views bilateral demonstrate plate fixation of the first second metatarsocuneiform joint left foot.  Mild degenerative changes arthrodesis of several of the lesser toes.     ASSESSMENT:       ICD-10-CM    1. Closed dislocation of tarsometatarsal joint of left foot, initial encounter  S93.325A    2. Rheumatoid arthritis of both ankles, unspecified whether rheumatoid factor present (H)  M06.9    3. Hyperkeratosis  L85.9    4. Onychodystrophy  L60.3         PLAN:  Reviewed patient's chart in [facility].      9/20/2022   Obtained and interpreted radiographs  Recommended custom molded orthotics order placed  Recommended appropriate shoe gear written instructions dispensed  Recommended follow-up with rheumatology to manage autoimmune disease.  Follow-up after utilizing the orthotics and changes in shoe gear  Also discussed with written instructions at home debridement recommended cream product such as Cetaphil once daily to create a barrier and daily abrasive debridement of all hyperkeratosis with an Amope or similar personal troy.  This must be performed every bath or shower to stay ahead of this rather than waiting once a month.  All questions were answered.  Follow-up as  needed.      Lorenzo Coughlin DPM

## 2022-12-25 ENCOUNTER — HEALTH MAINTENANCE LETTER (OUTPATIENT)
Age: 62
End: 2022-12-25

## 2023-07-09 ENCOUNTER — HEALTH MAINTENANCE LETTER (OUTPATIENT)
Age: 63
End: 2023-07-09

## 2024-02-08 ENCOUNTER — APPOINTMENT (RX ONLY)
Dept: URBAN - METROPOLITAN AREA CLINIC 121 | Facility: CLINIC | Age: 64
Setting detail: DERMATOLOGY
End: 2024-02-08

## 2024-02-08 DIAGNOSIS — Z85.828 PERSONAL HISTORY OF OTHER MALIGNANT NEOPLASM OF SKIN: ICD-10-CM

## 2024-02-08 DIAGNOSIS — L82.0 INFLAMED SEBORRHEIC KERATOSIS: ICD-10-CM

## 2024-02-08 DIAGNOSIS — L81.4 OTHER MELANIN HYPERPIGMENTATION: ICD-10-CM

## 2024-02-08 DIAGNOSIS — L57.0 ACTINIC KERATOSIS: ICD-10-CM

## 2024-02-08 DIAGNOSIS — L82.1 OTHER SEBORRHEIC KERATOSIS: ICD-10-CM

## 2024-02-08 DIAGNOSIS — D18.0 HEMANGIOMA: ICD-10-CM

## 2024-02-08 PROBLEM — D18.01 HEMANGIOMA OF SKIN AND SUBCUTANEOUS TISSUE: Status: ACTIVE | Noted: 2024-02-08

## 2024-02-08 PROCEDURE — 99203 OFFICE O/P NEW LOW 30 MIN: CPT | Mod: 25

## 2024-02-08 PROCEDURE — ? COUNSELING

## 2024-02-08 PROCEDURE — 17110 DESTRUCTION B9 LES UP TO 14: CPT

## 2024-02-08 PROCEDURE — ? ORDER FOR PHOTODYNAMIC THERAPY

## 2024-02-08 PROCEDURE — ? PRESCRIPTION

## 2024-02-08 PROCEDURE — ? LIQUID NITROGEN

## 2024-02-08 PROCEDURE — 17000 DESTRUCT PREMALG LESION: CPT | Mod: 59

## 2024-02-08 RX ORDER — VALACYCLOVIR HYDROCHLORIDE 1 G/1
TABLET, FILM COATED ORAL AS DIRECTED
Qty: 4 | Refills: 1 | Status: ERX | COMMUNITY
Start: 2024-02-08

## 2024-02-08 RX ADMIN — VALACYCLOVIR HYDROCHLORIDE: 1 TABLET, FILM COATED ORAL at 00:00

## 2024-02-08 ASSESSMENT — LOCATION DETAILED DESCRIPTION DERM
LOCATION DETAILED: LEFT SUPERIOR MEDIAL UPPER BACK
LOCATION DETAILED: LEFT MID-UPPER BACK
LOCATION DETAILED: LEFT INFERIOR MEDIAL FOREHEAD
LOCATION DETAILED: LEFT DISTAL DORSAL FOREARM
LOCATION DETAILED: RIGHT INFERIOR MEDIAL MIDBACK
LOCATION DETAILED: LEFT ANTERIOR DISTAL UPPER ARM
LOCATION DETAILED: RIGHT UPPER CUTANEOUS LIP

## 2024-02-08 ASSESSMENT — LOCATION SIMPLE DESCRIPTION DERM
LOCATION SIMPLE: LEFT FOREHEAD
LOCATION SIMPLE: LEFT UPPER ARM
LOCATION SIMPLE: LEFT UPPER BACK
LOCATION SIMPLE: RIGHT LIP
LOCATION SIMPLE: RIGHT LOWER BACK
LOCATION SIMPLE: LEFT FOREARM

## 2024-02-08 ASSESSMENT — LOCATION ZONE DERM
LOCATION ZONE: FACE
LOCATION ZONE: ARM
LOCATION ZONE: LIP
LOCATION ZONE: TRUNK

## 2024-02-08 NOTE — PROCEDURE: LIQUID NITROGEN
Post-Care Instructions: I reviewed with the patient in detail post-care instructions. Patient is to wear sunprotection, and avoid picking at any of the treated lesions. Pt may apply Vaseline to crusted or scabbing areas. Patient has also received a handout with instructions on caring for the wound and office contact information.
Number Of Freeze-Thaw Cycles: 3 freeze-thaw cycles
Show Applicator Variable?: Yes
Duration Of Freeze Thaw-Cycle (Seconds): 3
Detail Level: Detailed
Consent: The patient's consent was obtained including but not limited to risks of crusting, scabbing, blistering, scarring, darker or lighter pigmentary change, recurrence, incomplete removal and infection.
Spray Paint Technique: No
Medical Necessity Information: It is in your best interest to select a reason for this procedure from the list below. All of these items fulfill various CMS LCD requirements except the new and changing color options.
Application Tool (Optional): Liquid Nitrogen Sprayer
Spray Paint Text: The liquid nitrogen was applied to the skin utilizing a spray paint frosting technique.
Post-Care Instructions: I reviewed with the patient in detail post-care instructions. Patient is to wear sunprotection, and avoid picking at any of the treated lesions. Pt may apply Vaseline to crusted or scabbing areas.
Number Of Freeze-Thaw Cycles: 4 freeze-thaw cycles
no

## 2024-02-08 NOTE — PROCEDURE: ORDER FOR PHOTODYNAMIC THERAPY
Scalp Incubation Time: 2 hours
Detail Level: Zone
Face And Ears Incubation Time: 90 min
Occlusion: No
Face And Neck Incubation Time: 90 minute
Consent: The procedure and risks were reviewed with the patient including but not limited to: burning, pigmentary changes, pain, blistering, scabbing, redness, and the possibility of needing numerous treatments. Strict photoprotection after the procedure was also discussed.
Chest Incubation Time: 2 hour
Frequency Of Pdt: Single Treatment
Face And Scalp Incubation Time: 2 Hour for the face and 2 Hours for the scalp
Pdt Type: NEMO-U
Location: Face
Photosensitizer: Levulan
Face Incubation Time: 90 minutes
Neck Incubation Time: 1 Hour
Hands Incubation Time: 3 hours

## 2024-04-30 ENCOUNTER — APPOINTMENT (RX ONLY)
Dept: URBAN - METROPOLITAN AREA CLINIC 121 | Facility: CLINIC | Age: 64
Setting detail: DERMATOLOGY
End: 2024-04-30

## 2024-04-30 DIAGNOSIS — L57.0 ACTINIC KERATOSIS: ICD-10-CM

## 2024-04-30 PROCEDURE — ? INVENTORY

## 2024-04-30 PROCEDURE — ? PDT: BLUE

## 2024-04-30 PROCEDURE — 96574 DBRDMT PRMLG LES W/PDT: CPT

## 2024-04-30 ASSESSMENT — LOCATION ZONE DERM: LOCATION ZONE: FACE

## 2024-04-30 ASSESSMENT — LOCATION DETAILED DESCRIPTION DERM: LOCATION DETAILED: LEFT INFERIOR CENTRAL MALAR CHEEK

## 2024-04-30 ASSESSMENT — LOCATION SIMPLE DESCRIPTION DERM: LOCATION SIMPLE: LEFT CHEEK

## 2024-04-30 NOTE — PROCEDURE: PDT: BLUE
Show Anesthesia In Plan?: Yes
Number Of Kerasticks/Tubes Billed For: 1
Detail Level: Zone
Who Performed The Pdt?: Performed by MD TITA, NICHOLAS or AMANUEL with Pre-Procedure Debridement of Hyperkeratotic Lesions (65042)
Illumination Time: 00:16:40
Medical Necessity: Precancerous Lesions
Post-Care Instructions: I reviewed with the patient in detail post-care instructions. Patient is to avoid sunlight for the next 2 days, and wear sun protection. Patients may expect sunburn like redness, discomfort and scabbing.
Treatment Number: 0
Light Source: Rj-U
Debridement Text (Will Only Render In Visit Note If You Select Debridement Option Under Who Performed The Pdt Field): Prior to application of the photodynamic medication the hyperkeratotic lesions were curetted to make them more amenable to therapy.
Incubation Time: 2 hours
Who Performed The Pdt (Provider): Dr. Barton
Pre-Procedure Text: The treatment areas were cleaned and prepped in the usual fashion.
Incubation Start Time: 10:47
Occlusion: No
Ndc# (Optional): 57605-274-18
Anesthesia Type: 1% lidocaine with epinephrine
Which Photosensitizer Was Used: Levulan
Show Inventory Tab: Hide
Consent: Written consent obtained.  The risks were reviewed with the patient including but not limited to: pigmentary changes, pain, blistering, scabbing, redness, and the remote possibility of scarring.
Incubation End Time: 12:47

## 2024-07-23 ENCOUNTER — RX ONLY (OUTPATIENT)
Age: 64
Setting detail: RX ONLY
End: 2024-07-23

## 2024-07-23 RX ORDER — FLUOROURACIL 2 G/40G
CREAM TOPICAL
Qty: 40 | Refills: 0 | Status: ERX | COMMUNITY
Start: 2024-07-23

## 2024-09-01 ENCOUNTER — HEALTH MAINTENANCE LETTER (OUTPATIENT)
Age: 64
End: 2024-09-01

## 2025-01-04 ENCOUNTER — HEALTH MAINTENANCE LETTER (OUTPATIENT)
Age: 65
End: 2025-01-04